# Patient Record
Sex: MALE | Race: WHITE | Employment: FULL TIME | URBAN - METROPOLITAN AREA
[De-identification: names, ages, dates, MRNs, and addresses within clinical notes are randomized per-mention and may not be internally consistent; named-entity substitution may affect disease eponyms.]

---

## 2021-04-16 ENCOUNTER — HOSPITAL ENCOUNTER (OUTPATIENT)
Age: 56
Discharge: HOME | End: 2021-04-16
Payer: COMMERCIAL

## 2021-04-16 VITALS
HEART RATE: 66 BPM | SYSTOLIC BLOOD PRESSURE: 139 MMHG | TEMPERATURE: 98 F | RESPIRATION RATE: 18 BRPM | DIASTOLIC BLOOD PRESSURE: 97 MMHG | OXYGEN SATURATION: 98 %

## 2021-04-16 DIAGNOSIS — J02.9 SORE THROAT: ICD-10-CM

## 2021-04-16 DIAGNOSIS — J06.9 VIRAL URI: Primary | ICD-10-CM

## 2021-04-16 PROCEDURE — U0002 COVID-19 LAB TEST NON-CDC: HCPCS | Performed by: NURSE PRACTITIONER

## 2021-04-16 PROCEDURE — 87880 STREP A ASSAY W/OPTIC: CPT | Performed by: NURSE PRACTITIONER

## 2021-04-16 PROCEDURE — 99203 OFFICE O/P NEW LOW 30 MIN: CPT | Performed by: NURSE PRACTITIONER

## 2021-04-16 PROCEDURE — 87081 CULTURE SCREEN ONLY: CPT | Performed by: NURSE PRACTITIONER

## 2021-04-16 NOTE — ED PROVIDER NOTES
Patient Seen in: [facility] [address]      History   Patient presents with:  Sinus Problem  Sore Throat    Stated Complaint: COVID TEST / SORE THROAT / CONGESTION / FEVER / SINUS PRESSURE    HPI/Subjective:   HPI    30-year-old male presents f oropharyngeal exudate. Tonsils: No tonsillar exudate or tonsillar abscesses. 1+ on the right. 1+ on the left. Eyes:      Conjunctiva/sclera: Conjunctivae normal.   Cardiovascular:      Rate and Rhythm: Normal rate and regular rhythm.       Heart soun Disposition:  Discharge  4/16/2021 12:13 pm    Follow-up:  Siena Russell MD  [address] [place] [address] [phone]    In 1 week  As needed, If symptoms worsen          Medications Prescribed:  There are no discharge medication

## 2021-04-16 NOTE — ED INITIAL ASSESSMENT (HPI)
Pt c/o sinus congestion with sore throat since Sunday. Pt did a rapid [facility] self test on Monday and it was negative for covid.

## 2021-04-17 RX ORDER — LIDOCAINE HYDROCHLORIDE 20 MG/ML
5 SOLUTION OROPHARYNGEAL
Qty: 100 ML | Refills: 0 | Status: SHIPPED | OUTPATIENT
Start: 2021-04-17

## 2021-04-17 NOTE — ED NOTES
Pt called inquiring about his visit yesterday and wants to mention that he was told by his dentist 6 months ago that he needs his wisdom teeth out and the root of the tooth might be dead.  Pt asking if his s/s could be related to a tooth infect and asking i

## 2021-04-17 NOTE — ED PROVIDER NOTES
Patient called and concerned about persistent pharyngitis. Feels that he may have strep pharyngitis. I discussed with patient throat culture is pending.   He is also concerned that he may have a dead tooth that needs to be removed but he is denying any de

## 2021-04-18 NOTE — ED NOTES
Left message on identified voice mail that his strep culture results are negative. He can call us back if he has questions or concerns.

## 2021-11-01 ENCOUNTER — HOSPITAL ENCOUNTER (OUTPATIENT)
Age: 56
Discharge: HOME | End: 2021-11-01
Payer: COMMERCIAL

## 2021-11-01 ENCOUNTER — APPOINTMENT (OUTPATIENT)
Dept: CT IMAGING | Age: 56
End: 2021-11-01
Attending: PHYSICIAN ASSISTANT
Payer: COMMERCIAL

## 2021-11-01 VITALS
HEART RATE: 69 BPM | RESPIRATION RATE: 16 BRPM | DIASTOLIC BLOOD PRESSURE: 80 MMHG | WEIGHT: 220 LBS | TEMPERATURE: 98 F | SYSTOLIC BLOOD PRESSURE: 150 MMHG | BODY MASS INDEX: 30.8 KG/M2 | HEIGHT: 71 IN

## 2021-11-01 DIAGNOSIS — G44.311 INTRACTABLE ACUTE POST-TRAUMATIC HEADACHE: Primary | ICD-10-CM

## 2021-11-01 DIAGNOSIS — S06.0X0A CONCUSSION WITHOUT LOSS OF CONSCIOUSNESS, INITIAL ENCOUNTER: ICD-10-CM

## 2021-11-01 PROCEDURE — 99214 OFFICE O/P EST MOD 30 MIN: CPT

## 2021-11-01 PROCEDURE — 70450 CT HEAD/BRAIN W/O DYE: CPT | Performed by: PHYSICIAN ASSISTANT

## 2021-11-01 RX ORDER — IBUPROFEN 600 MG/1
600 TABLET ORAL EVERY 6 HOURS PRN
COMMUNITY

## 2021-11-01 RX ORDER — ACETAMINOPHEN 500 MG
500 TABLET ORAL EVERY 6 HOURS PRN
COMMUNITY
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
## 2021-11-01 NOTE — ED PROVIDER NOTES
Patient Seen in: [facility]      History   Patient presents with:  Trauma  Headache    Stated Complaint: headache    Subjective:   HPI    Libra French is a 28-year-old male who presents today for evaluation of headache.   He explains that 3 days src Oral   SpO2    O2 Device None (Room air)       Current:/80   Pulse 69   Temp 98.3 °F (36.8 °C) (Oral)   Resp 16   Ht 180.3 cm (5' 11\")   Wt 99.8 kg   BMI 30.68 kg/m²         Physical Exam    Nursing note reviewed. Vital signs reviewed.   General: with palpation to extremities, normal ROM, normal sensation.       ED Course   Labs Reviewed - No data to display       Patient is noted to have an elevated blood pressure reading of 161/106 upon arrival.  He states that he was told over the summer that he patient's satisfaction prior to discharge today.                    Disposition and Plan     Clinical Impression:  Intractable acute post-traumatic headache  (primary encounter diagnosis)  Concussion without loss of consciousness, initial encounter     Disp

## 2021-11-01 NOTE — ED QUICK NOTES
Pt reports he was told his blood pressure was elevated a few months ago but that it then went back to normal range. To recheck prior to discharge.